# Patient Record
Sex: MALE | Race: WHITE | URBAN - METROPOLITAN AREA
[De-identification: names, ages, dates, MRNs, and addresses within clinical notes are randomized per-mention and may not be internally consistent; named-entity substitution may affect disease eponyms.]

---

## 2018-09-30 ENCOUNTER — HOSPITAL ENCOUNTER (EMERGENCY)
Facility: CLINIC | Age: 18
Discharge: HOME | End: 2018-09-30
Attending: EMERGENCY MEDICINE | Admitting: EMERGENCY MEDICINE
Payer: COMMERCIAL

## 2018-09-30 VITALS
DIASTOLIC BLOOD PRESSURE: 99 MMHG | TEMPERATURE: 97.8 F | SYSTOLIC BLOOD PRESSURE: 155 MMHG | OXYGEN SATURATION: 99 % | RESPIRATION RATE: 14 BRPM

## 2018-09-30 DIAGNOSIS — R21 RASH AND NONSPECIFIC SKIN ERUPTION: ICD-10-CM

## 2018-09-30 PROCEDURE — 87591 N.GONORRHOEAE DNA AMP PROB: CPT | Performed by: EMERGENCY MEDICINE

## 2018-09-30 PROCEDURE — 99284 EMERGENCY DEPT VISIT MOD MDM: CPT | Mod: Z6 | Performed by: EMERGENCY MEDICINE

## 2018-09-30 PROCEDURE — 99284 EMERGENCY DEPT VISIT MOD MDM: CPT | Mod: 25 | Performed by: EMERGENCY MEDICINE

## 2018-09-30 PROCEDURE — 25000128 H RX IP 250 OP 636: Performed by: EMERGENCY MEDICINE

## 2018-09-30 PROCEDURE — 25000132 ZZH RX MED GY IP 250 OP 250 PS 637: Performed by: EMERGENCY MEDICINE

## 2018-09-30 PROCEDURE — 96372 THER/PROPH/DIAG INJ SC/IM: CPT | Performed by: EMERGENCY MEDICINE

## 2018-09-30 PROCEDURE — 25000125 ZZHC RX 250: Performed by: EMERGENCY MEDICINE

## 2018-09-30 PROCEDURE — 86780 TREPONEMA PALLIDUM: CPT | Performed by: EMERGENCY MEDICINE

## 2018-09-30 PROCEDURE — 87491 CHLMYD TRACH DNA AMP PROBE: CPT | Performed by: EMERGENCY MEDICINE

## 2018-09-30 RX ORDER — DOXYCYCLINE 100 MG/1
100 CAPSULE ORAL 2 TIMES DAILY
Qty: 20 CAPSULE | Refills: 0 | Status: SHIPPED | OUTPATIENT
Start: 2018-09-30 | End: 2018-10-10

## 2018-09-30 RX ORDER — CEFTRIAXONE SODIUM 250 MG
250 VIAL (EA) INJECTION ONCE
Status: DISCONTINUED | OUTPATIENT
Start: 2018-09-30 | End: 2018-09-30

## 2018-09-30 RX ORDER — DOXYCYCLINE 100 MG/1
100 CAPSULE ORAL ONCE
Status: COMPLETED | OUTPATIENT
Start: 2018-09-30 | End: 2018-09-30

## 2018-09-30 RX ADMIN — DOXYCYCLINE HYCLATE 100 MG: 100 CAPSULE ORAL at 12:12

## 2018-09-30 RX ADMIN — LIDOCAINE HYDROCHLORIDE 250 MG: 10 INJECTION, SOLUTION EPIDURAL; INFILTRATION; INTRACAUDAL; PERINEURAL at 13:08

## 2018-09-30 ASSESSMENT — ENCOUNTER SYMPTOMS
DYSURIA: 0
FEVER: 0

## 2018-09-30 NOTE — ED AVS SNAPSHOT
[facility], Emergency Department    [address] [address]    Phone:  [phone]                                       Taran Collins   MRN: [id number]    Department:  [facility], Emergency Department   Date of Visit:  9/30/2018           Patient Information     Date Of Birth          2000        Your diagnoses for this visit were:     Rash and nonspecific skin eruption        You were seen by Herb Alba MD.        Discharge Instructions       Please make an appointment to follow up with [facility] (phone: [phone]) if not improving.    Doxycycline as directed.    No intercourse until you complete antibiotic treatment.    We will call you with any of your tests are positive.    Return to the emergency department for any problems.      24 Hour Appointment Hotline       To make an appointment at any [facility], call [phone] ([phone]). If you don't have a family doctor or clinic, we will help you find one. [facility] clinics are conveniently located to serve the needs of you and your family.             Review of your medicines      START taking        Dose / Directions Last dose taken    doxycycline 100 MG capsule   Commonly known as:  VIBRAMYCIN   Dose:  100 mg   Quantity:  20 capsule        Take 1 capsule (100 mg) by mouth 2 times daily for 10 days   Refills:  0                Prescriptions were sent or printed at these locations (1 Prescription)                   Other Prescriptions                Printed at Department/Unit printer (1 of 1)         doxycycline (VIBRAMYCIN) 100 MG capsule                Procedures and tests performed during your visit     Chlamydia trachomatis PCR    Neisseria gonorrhoeae PCR    Treponema Abs w Reflex to RPR and Titer      Orders Needing Specimen Collection     None      Pending Results     Date and Time Order Name Status Description    9/30/2018 1134 Treponema Abs w Reflex to RPR and Titer In  "process     2018 1134 Neisseria gonorrhoeae PCR In process     2018 1134 Chlamydia trachomatis PCR In process             Pending Culture Results     Date and Time Order Name Status Description    2018 1134 Neisseria gonorrhoeae PCR In process     2018 1134 Chlamydia trachomatis PCR In process             Pending Results Instructions     If you had any lab results that were not finalized at the time of your Discharge, you can call the ED Lab Result RN at [phone]. You will be contacted by this team for any positive Lab results or changes in treatment. The nurses are available 7 days a week from 10A to 6:30P.  You can leave a message 24 hours per day and they will return your call.        Thank you for choosing [facility]       Thank you for choosing [facility] for your care. Our goal is always to provide you with excellent care. Hearing back from our patients is one way we can continue to improve our services. Please take a few minutes to complete the written survey that you may receive in the mail after you visit with us. Thank you!        [facility] Information     ebindle lets you send messages to your doctor, view your test results, renew your prescriptions, schedule appointments and more. To sign up, go to [email or web address] . Click on \"Log in\" on the left side of the screen, which will take you to the Welcome page. Then click on \"Sign up Now\" on the right side of the page.     You will be asked to enter the access code listed below, as well as some personal information. Please follow the directions to create your username and password.     Your access code is: [email or web address]  Expires: 2018  1:18 PM     Your access code will  in 90 days. If you need help or a new code, please call your [facility] clinic or [phone].        Care EveryWhere ID     This is your Care EveryWhere ID. This could be used by other organizations to access your [facility] medical records  [id number]      "   Equal Access to Services     CARINA DUMONT : Aletha Frank, donny tadeo, sorin mart. So [facility] [phone].    ATENCIÓN: Si habla español, tiene a eagle disposición servicios gratuitos de asistencia lingüística. Llame al [phone].    We comply with applicable federal civil rights laws and [place] laws. We do not discriminate on the basis of race, color, national origin, age, disability, sex, sexual orientation, or gender identity.            After Visit Summary       This is your record. Keep this with you and show to your community pharmacist(s) and doctor(s) at your next visit.

## 2018-09-30 NOTE — DISCHARGE INSTRUCTIONS
Please make an appointment to follow up with [facility] (phone: [phone]) if not improving.    Doxycycline as directed.    No intercourse until you complete antibiotic treatment.    We will call you with any of your tests are positive.    Return to the emergency department for any problems.

## 2018-09-30 NOTE — ED PROVIDER NOTES
History     Chief Complaint   Patient presents with     Penile Discharge     Pt has rash and discharge     HPI  Taran Collins is a 18 year old male, who presents to the Emergency Department for evaluation of penile discharge and rash with concern for chlamydia exposure. Patient reports that he had unprotected intercourse two days ago with a female. Today he noticed a rash around the pubic area with some lesions on the penis as well as clear penile discharge. He denies any fevers, dysuria, pruritis or pain. Patient reports that his partner denies STI history.     I have reviewed the Medications, Allergies, Past Medical and Surgical History, and Social History in the [facility] system.    PAST MEDICAL HISTORY: No past medical history on file.    PAST SURGICAL HISTORY: No past surgical history on file.    FAMILY HISTORY: No family history on file.    SOCIAL HISTORY:   Social History   Substance Use Topics     Smoking status: Not on file     Smokeless tobacco: Not on file     Alcohol use Not on file     No current facility-administered medications for this encounter.      Current Outpatient Prescriptions   Medication     doxycycline (VIBRAMYCIN) 100 MG capsule      Not on File    Review of Systems   Constitutional: Negative for fever.   Genitourinary: Positive for discharge (clear). Negative for dysuria and penile pain.   Skin: Positive for rash (pubic).   All other systems reviewed and are negative.      Physical Exam   BP: 144/88  Heart Rate: 78  Temp: 97.8  F (36.6  C)  Resp: 12  SpO2: 100 %      Physical Exam   Constitutional: He is oriented to person, place, and time. He appears well-developed and well-nourished.  Non-toxic appearance. He does not appear ill. No distress.   HENT:   Head: Normocephalic and atraumatic.   Eyes: EOM are normal. Pupils are equal, round, and reactive to light. No scleral icterus.   Neck: Normal range of motion. Neck supple.   Cardiovascular: Normal rate.    Pulmonary/Chest: Effort  normal. No respiratory distress.   Genitourinary: Penis normal.   Neurological: He is alert and oriented to person, place, and time. He has normal strength. No sensory deficit.   Skin: Skin is warm and dry. Rash noted. No pallor.   Papular eruption noted on lower abdomen, pubic area with 2 or 3 lesions noted on shaft of penis.  No palpable inguinal lymphadenopathy noted.   Psychiatric: He has a normal mood and affect. His behavior is normal.   Nursing note and vitals reviewed.      ED Course     ED Course     Procedures               Assessments & Plan (with Medical Decision Making)   This patient presented to the Emergency Department with concerns that he has chlamydia due to a clear penile discharge.  He also has developed a rash that appears papular in nature and is centered around the pubis, lower abdomen with a couple of lesions on the penis.  No indication clinically to suggest pubic lice.  He otherwise appears in no acute distress.  He was treated for the possibility of gonorrhea and chlamydia and I did send syphilis testing.  He will be notified if any of this returns positive and I did recommend that should anything turn positive, he should go into [facility] for HIV testing.  He was treated with doxycycline in case the rash is more of a folliculitis as it does appear to be papular and centered around hair follicles.  He was discharged in good condition after receiving a dose of Rocephin and with a prescription for a 7-day course of doxycycline.    This part of the medical record was transcribed by Nick Mukherjee, Medical Scribe, from a dictation done by Herb Alba MD.       I have reviewed the nursing notes.    I have reviewed the findings, diagnosis, plan and need for follow up with the patient.    Discharge Medication List as of 9/30/2018  1:37 PM      START taking these medications    Details   doxycycline (VIBRAMYCIN) 100 MG capsule Take 1 capsule (100 mg) by mouth 2 times daily for 10 days,  Disp-20 capsule, R-0, Local Print             Final diagnoses:   Rash and nonspecific skin eruption     I, Nohemy Quach, am serving as a trained medical scribe to document services personally performed by Kevin Alba MD, based on the provider's statements to me.   IKevin MD, was physically present and have reviewed and verified the accuracy of this note documented by Nohemy Quach.    9/30/2018   [facility], [place], EMERGENCY DEPARTMENT     Herb Alba MD  10/02/18 1041

## 2018-09-30 NOTE — ED AVS SNAPSHOT
[facility], [place], Emergency Department    [address] [place] [address]    Phone:  [phone]                                       Taran Collins   MRN: [id number]    Department:  [facility], Emergency Department   Date of Visit:  9/30/2018           After Visit Summary Signature Page     I have received my discharge instructions, and my questions have been answered. I have discussed any challenges I see with this plan with the nurse or doctor.    ..........................................................................................................................................  Patient/Patient Representative Signature      ..........................................................................................................................................  Patient Representative Print Name and Relationship to Patient    ..................................................               ................................................  Date                                   Time    ..........................................................................................................................................  Reviewed by Signature/Title    ...................................................              ..............................................  Date                                               Time          22EPIC Rev 08/18

## 2018-10-01 LAB
C TRACH DNA SPEC QL NAA+PROBE: NEGATIVE
N GONORRHOEA DNA SPEC QL NAA+PROBE: NEGATIVE
SPECIMEN SOURCE: NORMAL
SPECIMEN SOURCE: NORMAL
T PALLIDUM AB SER QL: NONREACTIVE